# Patient Record
Sex: MALE | Race: WHITE | NOT HISPANIC OR LATINO | Employment: OTHER | URBAN - METROPOLITAN AREA
[De-identification: names, ages, dates, MRNs, and addresses within clinical notes are randomized per-mention and may not be internally consistent; named-entity substitution may affect disease eponyms.]

---

## 2022-06-30 ENCOUNTER — HOSPITAL ENCOUNTER (EMERGENCY)
Facility: MEDICAL CENTER | Age: 82
End: 2022-06-30
Attending: EMERGENCY MEDICINE
Payer: MEDICARE

## 2022-06-30 ENCOUNTER — APPOINTMENT (OUTPATIENT)
Dept: RADIOLOGY | Facility: MEDICAL CENTER | Age: 82
End: 2022-06-30
Attending: EMERGENCY MEDICINE
Payer: MEDICARE

## 2022-06-30 VITALS
HEIGHT: 68 IN | OXYGEN SATURATION: 96 % | RESPIRATION RATE: 20 BRPM | BODY MASS INDEX: 27.28 KG/M2 | SYSTOLIC BLOOD PRESSURE: 158 MMHG | TEMPERATURE: 98.4 F | HEART RATE: 84 BPM | DIASTOLIC BLOOD PRESSURE: 92 MMHG | WEIGHT: 180 LBS

## 2022-06-30 DIAGNOSIS — Z79.01 ON CONTINUOUS ORAL ANTICOAGULATION: ICD-10-CM
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
## 2022-06-30 DIAGNOSIS — S09.90XA CLOSED HEAD INJURY, INITIAL ENCOUNTER: ICD-10-CM

## 2022-06-30 PROCEDURE — 70450 CT HEAD/BRAIN W/O DYE: CPT

## 2022-06-30 PROCEDURE — 99285 EMERGENCY DEPT VISIT HI MDM: CPT | Mod: 25

## 2022-06-30 PROCEDURE — 96375 TX/PRO/DX INJ NEW DRUG ADDON: CPT

## 2022-06-30 PROCEDURE — 96374 THER/PROPH/DIAG INJ IV PUSH: CPT

## 2022-06-30 PROCEDURE — 72125 CT NECK SPINE W/O DYE: CPT

## 2022-06-30 RX ORDER — MULTIVIT-MIN/FA/LYCOPEN/LUTEIN .4-300-25
1 TABLET ORAL DAILY
COMMUNITY

## 2022-06-30 RX ORDER — IRBESARTAN 150 MG/1
150 TABLET ORAL EVERY EVENING
COMMUNITY
Start: 2022-05-19

## 2022-06-30 RX ORDER — IRBESARTAN 150 MG/1
150 TABLET ORAL NIGHTLY
Status: SHIPPED | COMMUNITY
End: 2022-06-30

## 2022-06-30 RX ORDER — DIGOXIN 125 MCG
125 TABLET ORAL EVERY MORNING
COMMUNITY
Start: 2022-06-06

## 2022-06-30 RX ORDER — VALACYCLOVIR HYDROCHLORIDE 500 MG/1
500 TABLET, FILM COATED ORAL 2 TIMES DAILY PRN
COMMUNITY

## 2022-06-30 RX ORDER — CARVEDILOL 6.25 MG/1
6.25 TABLET ORAL NIGHTLY
COMMUNITY

## 2022-06-30 RX ORDER — HYDROCHLOROTHIAZIDE 25 MG/1
25 TABLET ORAL DAILY
Status: SHIPPED | COMMUNITY
End: 2022-06-30

## 2022-06-30 RX ORDER — BIOTIN 5 MG
5000 TABLET ORAL EVERY EVENING
COMMUNITY

## 2022-06-30 RX ORDER — ATORVASTATIN CALCIUM 20 MG/1
20 TABLET, FILM COATED ORAL NIGHTLY
Status: SHIPPED | COMMUNITY
End: 2022-06-30

## 2022-06-30 RX ORDER — DIGOXIN 125 MCG
125 TABLET ORAL DAILY
Status: SHIPPED | COMMUNITY
End: 2022-06-30

## 2022-06-30 RX ORDER — HYDROCHLOROTHIAZIDE 25 MG/1
25 TABLET ORAL EVERY MORNING
COMMUNITY
Start: 2022-06-06

## 2022-06-30 RX ORDER — ATORVASTATIN CALCIUM 40 MG/1
20 TABLET, FILM COATED ORAL
COMMUNITY
Start: 2022-05-08

## 2022-07-01 NOTE — ED TRIAGE NOTES
"Chief Complaint   Patient presents with   • T-5000     Pt reports falling down 8 stairs after drinking some alcohol today. Pt made a code TBI. 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 Pt does not remember the fall- states he is not sure if he lost consciousness or not. Pt does take 5mg of Eliquis daily for A-fib that is rate controlled with Diltiazem.      /83   Pulse 96   Temp 36.6 °C (97.8 °F) (Temporal)   Resp 20   Ht 1.727 m (5' 8\")   Wt 81.6 kg (180 lb)   SpO2 97%   BMI 27.37 kg/m²     "

## 2022-07-01 NOTE — ED PROVIDER NOTES
ED Provider Note    Scribed for Deep Mcfarland M.D. by Rnea Dutta. 6/30/2022, 8:07 PM.    Primary care provider: No primary care provider on file.  Means of arrival: EMS  History obtained from: Patient  History limited by: None    CHIEF COMPLAINT  Chief Complaint   Patient presents with    T-5000     Pt reports falling down 8 stairs after drinking some alcohol today. Pt made a code TBI. 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 Pt does not remember the fall- states he is not sure if he lost consciousness or not. Pt does take 5mg of Eliquis daily for A-fib that is rate controlled with Diltiazem.        HPI  David Gary Waterhouse is a 82 y.o. male who presents to the Emergency Department T-500 onset prior to arrival. The patient notes that he drank 2 martinis today and as he was walking down the stairs he fell. He does not recall the fall, but denies loss of consciousness. The patient has symptoms of a scratch to the elbow. He denies symptoms of neck pain. The patient has a history of arthritis, atrial fibrillation, and hypertension. He notes that he has lost 2 spouses in the last 6 years and is a chronic alcoholic. He reports a history of a paternal death due to a cerebral hemorrhage. He denies taking blood thinners. No alleviating or exacerbating factors noted.     REVIEW OF SYSTEMS  Pertinent positives include scratch to the elbow. Pertinent negatives include neck pain or loss of consciousness. All other systems negative.    PAST MEDICAL HISTORY  None noted.     SURGICAL HISTORY  patient denies any surgical history    SOCIAL HISTORY      Social History     Substance and Sexual Activity   Drug Use None       FAMILY HISTORY  None noted.     CURRENT MEDICATIONS  Home Medications       Reviewed by Saeed Watts (Pharmacy Tech) on 06/30/22 at 2156  Med List Status: Complete     Medication Last Dose Status   apixaban  "(ELIQUIS) 5mg Tab 6/30/2022 Active   aspirin EC (ECOTRIN) 81 MG Tablet Delayed Response 6/29/2022 Active   atorvastatin (LIPITOR) 40 MG Tab 6/29/2022 Active   Biotin 5 MG Tab 6/29/2022 Active   carvedilol (COREG) 6.25 MG Tab 6/29/2022 Active   digoxin (LANOXIN) 125 MCG Tab 6/30/2022 Active   hydroCHLOROthiazide (HYDRODIURIL) 25 MG Tab 6/30/2022 Active   irbesartan (AVAPRO) 150 MG Tab 6/29/2022 Active   Multiple Vitamins-Minerals (CENTRUM SILVER ADULT 50+) Tab 6/30/2022 Active   valACYclovir (VALTREX) 500 MG Tab 2 YEARS AGO Active                    ALLERGIES  No Known Allergies    PHYSICAL EXAM  VITAL SIGNS: /83   Pulse 96   Temp 36.6 °C (97.8 °F) (Temporal)   Resp 20   Ht 1.727 m (5' 8\")   Wt 81.6 kg (180 lb)   SpO2 97%   BMI 27.37 kg/m²     Constitutional: Well developed, Well nourished, mild distress.   HENT: Normocephalic, Atraumatic, mask in place.  Eyes: Conjunctiva normal, No discharge.   Neck: Supple, No stridor   Cardiovascular: Normal heart rate, Normal rhythm, No murmurs, equal pulses.   Pulmonary: Normal breath sounds, No respiratory distress, No wheezing, No rales, No rhonchi.  Chest: No chest wall tenderness or deformity.   Abdomen:Soft, No tenderness, No masses, no rebound, no guarding.   Back: No CVA tenderness.   Musculoskeletal: No major deformities noted, No tenderness.   Skin: Warm, Dry, No erythema, No rash.   Neurologic: Alert & oriented x 3, Normal motor function,  No focal deficits noted.   Psychiatric: Intoxicated    RADIOLOGY  CT-HEAD W/O   Final Result         1.  No acute intracranial process.      2. Diffuse atrophy and periventricular white matter changes consistent with chronic small vessel disease.      3. Calcified masses adjacent to the anterior and posterior falx are consistent with meningiomas. No significant mass effect is identified.      CT-CSPINE WITHOUT PLUS RECONS   Final Result         1. No acute fracture from C1 through T1 is visualized.           The " radiologist's interpretation of all radiological studies have been reviewed by me.    COURSE & MEDICAL DECISION MAKING  Pertinent Labs & Imaging studies reviewed. (See chart for details)    8:07 PM - Patient seen and examined at bedside. Ordered CT-C-Spine w/o recons and CT-Head w/o to evaluate his symptoms. The differential diagnoses include but are not limited to: Alcohol intoxication vs Inner cranial hemorrhage vs Cervical fracture.     8:20 PM - Patient was reevaluated at bedside. Discussed lab and radiology results with the patient.     10:10 PM - I discussed plan for discharge and follow up as outlined below. The patient is stable for discharge at this time and will return for any new or worsening symptoms. Patient verbalizes understanding and support with my plan for discharge.     Medical Decision Making: Patient presents after having a fall down 8 steps with positive loss of consciousness.  CT of the head and neck were obtained secondary fact patient is significantly intoxicated.  This does not show any intracranial hemorrhage patient has no other signs of injury at this point time he is able to ambulate without difficulty and he will be discharged to get a fever back to his hotel.    The patient will return for new or worsening symptoms and is stable at the time of discharge.    The patient is referred to a primary physician for diabetic screening, and for all other preventative health concerns.    DISPOSITION:  Patient will be discharged home in stable condition.    FOLLOW UP:  Your doctor      As needed    FINAL IMPRESSION  1. Closed head injury, initial encounter    2. On continuous oral anticoagulation    3. Fall, initial encounter        Rena MORGAN (Chantal), am scribing for, and in the presence of, Deep Mcfarland M.D.    Electronically signed by: Rena Dutta (Chantal), 6/30/2022    Deep MORGAN M.D. personally performed the services described in this  documentation, as scribed by Rena Dutta in my presence, and it is both accurate and complete.    The note accurately reflects work and decisions made by me.  Deep Mcfarland M.D.  7/1/2022  1:37 AM

## 2022-07-01 NOTE — ED NOTES
Med rec completed per patient at bedside with medication list (reviewed and returned).  Allergies reviewed with patient. NKDA.  No outpatient antibiotics in the last 30 days.  Patient's home pharmacy: [facility] in [place].    Patient is on ELIQUIS and ASPIRIN.    Patient states he takes his carvedilol only once per day, in the evening.

## 2022-07-01 NOTE — DISCHARGE PLANNING
Alert Team:    Pt's  recently  and pt has been feeling sad. This writer met with pt and discussed grief and loss resources in the [place] area. Also provided pt with crisis hotline phone numbers as an additional resource. Pt denies SI/HI/hallucinations. He is driving back to [place] either tomorrow morning or Saturday morning. Currently staying at the [facility]. Pt grateful for resources and plans to reach out after returning home. Updated RN.